# Patient Record
Sex: MALE | Race: WHITE
[De-identification: names, ages, dates, MRNs, and addresses within clinical notes are randomized per-mention and may not be internally consistent; named-entity substitution may affect disease eponyms.]

---

## 2017-04-17 ENCOUNTER — HOSPITAL ENCOUNTER (EMERGENCY)
Dept: HOSPITAL 62 - ER | Age: 32
LOS: 1 days | Discharge: HOME | End: 2017-04-18
Payer: OTHER GOVERNMENT

## 2017-04-17 DIAGNOSIS — L02.512: Primary | ICD-10-CM

## 2017-04-17 DIAGNOSIS — M79.645: ICD-10-CM

## 2017-04-17 PROCEDURE — 0H9GXZZ DRAINAGE OF LEFT HAND SKIN, EXTERNAL APPROACH: ICD-10-PCS | Performed by: EMERGENCY MEDICINE

## 2017-04-17 PROCEDURE — 99283 EMERGENCY DEPT VISIT LOW MDM: CPT

## 2017-04-18 VITALS — DIASTOLIC BLOOD PRESSURE: 74 MMHG | SYSTOLIC BLOOD PRESSURE: 136 MMHG

## 2017-04-18 NOTE — ER DOCUMENT REPORT
ED General





- General


Chief Complaint: Skin Problem


Stated Complaint: LEFT HAND POSSIBLE INFECTION


Notes: 


Patient is a 32-year-old male who presents with 2 days of progressively 

worsening swelling and pain to the dorsal aspect of his left index finger.  He 

describes this as a throbbing, constant, aching pain has been getting worse 

since onset.  Nothing improves or worsens his pain.  No history of similar 

symptoms in the past.  He denies any constitutional symptoms.  He denies any 

limited range with this digit.  No acute injury to this area.  He has not seen 

his primary care doctor regarding today's concerns.


TRAVEL OUTSIDE OF THE [place] IN LAST 30 DAYS: No





- Related Data


Allergies/Adverse Reactions: 


 





bupropion HCl [From Wellbutrin] Allergy (Verified 03/15/16 22:13)


 











Past Medical History





- General


Information source: Patient





- Social History


Smoking Status: Never Smoker


Frequency of alcohol use: None


Drug Abuse: None


Lives with: Spouse/Significant other


Family History: Reviewed & Not Pertinent


Patient has suicidal ideation: No


Patient has homicidal ideation: No


Renal/ Medical History: Denies: Hx Peritoneal Dialysis


Psychiatric Medical History: Reports: Hx Depression





- Immunizations


Immunizations up to date: Yes


Hx Diphtheria, Pertussis, Tetanus Vaccination: Yes





Physical Exam





- Vital signs


Vitals: 


 











Temp Pulse Resp BP Pulse Ox


 


 97.9 F   68   18   139/77 H  96 


 


 04/17/17 20:55  04/17/17 20:55  04/17/17 20:55  04/17/17 20:55  04/17/17 20:55











Interpretation: Normal


Notes: 


PHYSICAL EXAMINATION:





GENERAL: Well-appearing, well-nourished and in no acute distress.





HEAD: Atraumatic, normocephalic.





EYES:  sclera anicteric, conjunctiva are normal.





ENT: Moist mucous membranes.





NECK: Normal range of motion





LUNGS: Normal work of breathing





HEART: 2+ radial pulses bilaterally





EXTREMITIES: No pain along the flexor sheath of the left index finger.  Full 

flexion and extension of the DIP, PIP and DIP.  There is no circumferential 

edema.





NEUROLOGICAL: No focal neurological deficits. Moves all extremities 

spontaneously and on command.





PSYCH: Normal mood, normal affect.





SKIN: Warm, Dry, normal turgor, there is a small area of fluctuance at the base 

of the left index finger with minimal surrounding erythema.





Course





- Re-evaluation


Re-evalutation: 





04/18/17 00:53


Patient presents with a small abscess at the dorsal base of the left index 

finger with minimal surrounding cellulitis.  No evidence of flexor 

tenosynovitis.  Patient does not have pain over the flexor sheath.  His finger 

is not resting and flexion.  He has full flexion extension of the digit.  The 

abscess was incised and drained at the bedside without difficulty.  He will be 

started on Bactrim and Keflex as an outpatient.At this time will discharge with 

return precautions and follow-up recommendations.  Verbal discharge 

instructions given a the bedside and opportunity for questions given. 

Medication warnings reviewed. Patient is in agreement with this plan and has 

verbalized understanding of return precautions and the need for primary care 

follow-up in the next 24-72 hours.





- Vital Signs


Vital signs: 


 











Temp Pulse Resp BP Pulse Ox


 


 97.3 F   73   16   136/74 H  99 


 


 04/18/17 01:44  04/18/17 01:44  04/18/17 01:44  04/18/17 01:44  04/18/17 01:44














Procedures





- Incision and Drainage


  ** Left Finger


Type: Simple


Anesthetic type: 1% Lidocaine


mL's of anesthetic: 1


Blade size: 11


I&D procedure: Betadine prep applied


Incision Method: Incision made by scalpel


Amount/type of drainage: 1cc purulent drianage 





Discharge





- Discharge


Clinical Impression: 


 Abscess of left index finger





Condition: Good


Disposition: HOME, SELF-CARE


Additional Instructions: 


You were seen for an abscess that required drainage. Please clean this area 

with soap and water twice daily and apply a topical antibiotic. Dress the area 

after each cleaning.  Please return if you develop fever, vomiting, the pain at 

the site worsens, you notice spreading redness from the area, or you have any 

other symptoms that are concerning to you.


Prescriptions: 


Cephalexin Monohydrate [Keflex 500 mg Capsule] 500 mg PO QID #20 capsule


Sulfamethoxazole/Trimethoprim [Bactrim Ds Tablet] 2 tab PO BID 20 Days

## 2018-05-29 LAB
ANION GAP SERPL CALC-SCNC: 14 MMOL/L (ref 5–19)
APPEARANCE UR: CLEAR
APTT PPP: (no result) S
BILIRUB UR QL STRIP: NEGATIVE
BUN SERPL-MCNC: 10 MG/DL (ref 7–20)
CALCIUM: 9.3 MG/DL (ref 8.4–10.2)
CHLORIDE SERPL-SCNC: 104 MMOL/L (ref 98–107)
CO2 SERPL-SCNC: 28 MMOL/L (ref 22–30)
ERYTHROCYTE [DISTWIDTH] IN BLOOD BY AUTOMATED COUNT: 13.2 % (ref 11.5–14)
GLUCOSE SERPL-MCNC: 83 MG/DL (ref 75–110)
GLUCOSE UR STRIP-MCNC: NEGATIVE MG/DL
HCT VFR BLD CALC: 41.3 % (ref 37.9–51)
HGB BLD-MCNC: 14.3 G/DL (ref 13.5–17)
KETONES UR STRIP-MCNC: NEGATIVE MG/DL
MCH RBC QN AUTO: 29.9 PG (ref 27–33.4)
MCHC RBC AUTO-ENTMCNC: 34.7 G/DL (ref 32–36)
MCV RBC AUTO: 86 FL (ref 80–97)
NITRITE UR QL STRIP: NEGATIVE
PH UR STRIP: 8 [PH] (ref 5–9)
PLATELET # BLD: 186 10^3/UL (ref 150–450)
POTASSIUM SERPL-SCNC: 4.1 MMOL/L (ref 3.6–5)
PROT UR STRIP-MCNC: NEGATIVE MG/DL
RBC # BLD AUTO: 4.78 10^6/UL (ref 4.35–5.55)
SODIUM SERPL-SCNC: 145.9 MMOL/L (ref 137–145)
SP GR UR STRIP: 1
UROBILINOGEN UR-MCNC: NEGATIVE MG/DL (ref ?–2)
WBC # BLD AUTO: 6.4 10^3/UL (ref 4–10.5)

## 2018-06-05 ENCOUNTER — HOSPITAL ENCOUNTER (OUTPATIENT)
Dept: HOSPITAL 62 - OROUT | Age: 33
Discharge: HOME | End: 2018-06-05
Attending: ORTHOPAEDIC SURGERY
Payer: OTHER GOVERNMENT

## 2018-06-05 VITALS — DIASTOLIC BLOOD PRESSURE: 91 MMHG | SYSTOLIC BLOOD PRESSURE: 140 MMHG

## 2018-06-05 DIAGNOSIS — M65.832: Primary | ICD-10-CM

## 2018-06-05 DIAGNOSIS — Z79.1: ICD-10-CM

## 2018-06-05 DIAGNOSIS — Z88.8: ICD-10-CM

## 2018-06-05 DIAGNOSIS — M92.212: ICD-10-CM

## 2018-06-05 DIAGNOSIS — M25.532: ICD-10-CM

## 2018-06-05 PROCEDURE — 25825 ARTHRD WRIST WITH AUTOGRAFT: CPT

## 2018-06-05 PROCEDURE — 36415 COLL VENOUS BLD VENIPUNCTURE: CPT

## 2018-06-05 PROCEDURE — 85027 COMPLETE CBC AUTOMATED: CPT

## 2018-06-05 PROCEDURE — 80048 BASIC METABOLIC PNL TOTAL CA: CPT

## 2018-06-05 PROCEDURE — 64772 INCISION OF SPINAL NERVE: CPT

## 2018-06-05 PROCEDURE — 73100 X-RAY EXAM OF WRIST: CPT

## 2018-06-05 PROCEDURE — 01830 ANES ARTHR/NDSC WRST/HND NOS: CPT

## 2018-06-05 PROCEDURE — 81001 URINALYSIS AUTO W/SCOPE: CPT

## 2018-06-05 PROCEDURE — C1769 GUIDE WIRE: HCPCS

## 2018-06-05 NOTE — OPERATIVE REPORT
Operative Report


PREOPERATIVE DIAGNOSIS: Kienbock's disease left wrist


POSTOPERATIVE DIAGNOSIS: Same


OPERATION: 1.  Left Scaphocapitate Arthrodesis w/ Lunate Excision.  2. PIN 

Neurectomy


SURGEON: MIGUEL DESOUZA


ANESTHESIA: GA


COMPLICATIONS: 





None


ESTIMATED BLOOD LOSS: Minimal


PROCEDURE: 





Indication for above procedure:





33-year-old male who has a history of Kienbock's underwent previous radial 

shortening but failed to see improvement of his symptoms and continued to have 

discomfort we discussed treatment options including continued observation 

versus operative intervention after discussing alternatives to treatment 

including arthrodesis.  Joint decision was made to proceed with operative 

treatment.  Risks and benefits were explained patient verbalized understanding 

consented for the procedure.





Procedure In Detail:





Patient was seen and evaluated in the preoperative holding area.  The upper 

extremity was initialized and marked.  Patient received 2g of Ancef IV for 

bacterial prophylaxis.  Patient was taken back to the operative room where 

transferred to the operative table and placed under general anesthesia.  Once 

they were adequately anesthetized a nonsterile tourniquet was placed on the 

upper extremity.  A surgical team debriefing was performed ensuring all 

instrumentation was available, the surgical procedure was discussed with 

possible concerns reviewed.  The upper extremity was prepped with chlorhexidine 

and alcohol and draped in a sterile fashion.   A timeout was done identifying 

correct patient, procedure and extremity everyone in attendance agree with this 

and verbalized no concerns. The extremity was exsanguinated the tourniquet was 

inflated to 250 mmHg.





Longitudinal skin incision was made just ulnar to Sandy's tubercle.  Blunt 

dissection was performed branches of the superficial radial nerve were 

identified.  Small peripheral veins were coagulated with bipolar cautery.  EPL 

was identified and released distally.  I then elevated the second and fourth 

dorsal compartments exposing the DIC and DRC.  The posterior interosseous nerve 

was identified one centimeter resected.





A radial based ligament sparing capsulotomy was then made exposing the carpus.  

There was evidence of significant synovitis thus a synovectomy was performed.  

Inspection of the lunate fossa demonstrates no evidence of degenerative 

changes.  The radial-scaphoid articulation demonstrate no evidence of 

degenerative changes thus decision was made to proceed with scaphoid capitate 

arthrodesis.  The lunate was initially isolated and removed in its entirety 

along with any remaining small bone fragments.  There is significant avascular 

necrosis of the lunate which was not viable.  I then exposed the interval 

between the scaphoid and capitate.





With the use of rongeurs and a osteotome 80% of the articulation between the 

scaphoid and capitate was debrided down to good cancellus bone to allow for 

optimal arthrodesis union.  A 0.062 K wire was placed along the proximal 

scaphoid to reduce the radius scaphoid angle to 40.  A percutaneous K wire on 

oscillate was placed across the scaphoid capitate articulation maintaining 

reduction.  C-arm fluoroscopy was obtained to confirm adequate reduction.





With the measuring tool the BME staple measurement was determined.  0.045 K 

wires were placed into the capitate and scaphoid C-arm fluoroscopy was obtained 

confirming adequate placement of my staple.  A 15 x 12 mm staple was placed a 

small bone trough was placed between the 2 legs of the staple and adequate 

compression was established.  I then placed a second 15 x 12 mm staple just 

distal to the previous staple to provide further compression.  Once again K 

wires were placed confirming adequate location of the staple.





I then proceeded with harvesting autograft.  Sandy's tubercle was removed and 

a small cortical window was placed dorsally and cancellus bone was retrieved.  

The wound was copiously irrigated with normal saline.  The cancellus autograft 

was packed into any remaining space between the scaphoid and capitate until 

adequately filled.  Final C-arm fluoroscopy was obtained confirming adequate 

arthrodesis and alignment of the capitate to the scaphoid and the radial 

scaphoid angle.





The capsule was closed with interrupted 3-0 Ethibond suture.  A portion of the 

extensor retinaculum was placed volar to the EPL tendon at the area of the bone 

graft harvest site to avoid tendon irritation.  The tourniquet was then 

deflated and compression was held for 2 minutes.  Any peripheral bleeding was 

coagulated bipolar cautery into the wound was dry.  Subcutaneous tissues were 

closed with 3-0 Monocryl suture.  Skin was closed with running subcuticular 4-0 

Monocryl reinforced with Dermabond and Steri-Strips.  30 cc of 0.5% Marcaine 

with epinephrine was injected for postoperative pain control.  Patient was 

placed in a dorsal plaster splint in neutral position.





Sponge counts, instrument counts, needle counts counts were correct.  Patient 

was then awoken from anesthesia.  Transferred from the operating room table to 

the operating room stretcher.  There was no intraoperative complications 

patient tolerated procedure well stable to PACU.





Postoperative plan:





Patient will follow-up the office in 2 weeks at which point we will obtain 

radiographs and patient will be transitioned to a short arm cast until 

arthrodesis union.

## 2018-06-05 NOTE — RADIOLOGY REPORT (SQ)
EXAM DESCRIPTION:  NO CHG FLUORO; WRIST LEFT 2 VIEWS



COMPLETED DATE/TIME:  6/5/2018 11:35 am



REASON FOR STUDY:  LT WRIST SCAPHOCAPITATE ARTHRODESIS WITH LUNATE EXCISION M25.532  PAIN IN LEFT WRI
ST



COMPARISON:  None.



FLUOROSCOPY TIME:  1 minutes 2 seconds.

5 images saved to PACS.



TECHNIQUE:  Intra-operative images acquired during surgical procedure to evaluate progress.

NUMBER OF IMAGES: 5 images.



LIMITATIONS:  None.



FINDINGS:  Images of the wrist acquired during surgical procedure.



IMPRESSION:  IMAGE(S) OBTAINED DURING PROCEDURE.



COMMENT:  Quality :  Final reports for procedures using fluoroscopy that document radiation exp
osure indices, or exposure time and number of fluorographic images (if radiation exposure indices are
 not available)

Please consult full operative report of the attending physician for description of the procedure.



TECHNICAL DOCUMENTATION:  JOB ID:  [id number]

 2011 [employer]- All Rights Reserved



Reading location - IP/workstation name: [email or web address]

## 2018-06-05 NOTE — DISCHARGE SUMMARY
Discharge Summary (SDC)





- Discharge


Final Diagnosis: 





Kienbock's Disease Left Wrist


Date of Surgery: 06/05/18


Discharge Date: 06/05/18


Condition: Good


Treatment or Instructions: 











Schedule Follow Up w/ Dr. Flex Johnson @ [facility] for Surgery to be seen 

in 10-14 days or as scheduled


[place]: [phone] 


[place]: [phone]


[place]: [phone] 





Ice and elevate





Keep splint clean/dry/intact.





If your fingers become numb please unwrap the Ace wrap but leave the splint in 

place, if the sensation does not return within 30 minutes please return to the 

emergency department.





May begin finger range of motion attempting to make full fist.





Please use ibuprofen (Motrin or Advil) 600-800 mg every 8 hours as needed for 

pain or fever DO NOT TAKE w/ TORADOL may use once TORADOL complete.  You may 

also use acetaminophen (Tylenol) 1000 mg every 4-6 hours as needed for pain or 

fever.  Please be aware that many medications contain acetaminophen, do not 

exceed a total of 1000 mg of acetaminophen every 6 hours.  





If ibuprofen and acetaminophen are not sufficient for your pain you may take 

the Percocet/Norco.  Please be aware that the Percocet/Norco does contain 

Tylenol.





Stool softener of choice when on pain medication.


Prescriptions: 


Ketorolac Tromethamine [Toradol 10 mg Tablet] 10 mg PO Q8HP PRN #10 tablet


 PRN Reason: 


Oxycodone HCl/Acetaminophen [Percocet 7.5-325 mg Tablet] 1 - 2 tab PO ASDIR PRN 

#35 tab


 PRN Reason:

## 2018-06-05 NOTE — RADIOLOGY REPORT (SQ)
EXAM DESCRIPTION:  NO CHG FLUORO; WRIST LEFT 2 VIEWS



COMPLETED DATE/TIME:  6/5/2018 11:35 am



REASON FOR STUDY:  LT WRIST SCAPHOCAPITATE ARTHRODESIS WITH LUNATE EXCISION M25.532  PAIN IN LEFT WRI
ST



COMPARISON:  None.



FLUOROSCOPY TIME:  1 minutes 2 seconds.

5 images saved to PACS.



TECHNIQUE:  Intra-operative images acquired during surgical procedure to evaluate progress.

NUMBER OF IMAGES: 5 images.



LIMITATIONS:  None.



FINDINGS:  Images of the wrist acquired during surgical procedure.



IMPRESSION:  IMAGE(S) OBTAINED DURING PROCEDURE.



COMMENT:  Quality :  Final reports for procedures using fluoroscopy that document radiation exp
osure indices, or exposure time and number of fluorographic images (if radiation exposure indices are
 not available)

Please consult full operative report of the attending physician for description of the procedure.



TECHNICAL DOCUMENTATION:  JOB ID:  [id number]

 2011 [employer]- All Rights Reserved



Reading location - IP/workstation name: [email or web address]

## 2019-11-14 ENCOUNTER — HOSPITAL ENCOUNTER (EMERGENCY)
Dept: HOSPITAL 62 - ER | Age: 34
Discharge: HOME | End: 2019-11-14
Payer: OTHER GOVERNMENT

## 2019-11-14 VITALS — SYSTOLIC BLOOD PRESSURE: 134 MMHG | DIASTOLIC BLOOD PRESSURE: 77 MMHG
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
## 2019-11-14 DIAGNOSIS — Z23: ICD-10-CM

## 2019-11-14 DIAGNOSIS — S61.451A: Primary | ICD-10-CM
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
## 2019-11-14 PROCEDURE — 90471 IMMUNIZATION ADMIN: CPT

## 2019-11-14 PROCEDURE — 99283 EMERGENCY DEPT VISIT LOW MDM: CPT

## 2019-11-14 PROCEDURE — 90715 TDAP VACCINE 7 YRS/> IM: CPT

## 2019-11-14 PROCEDURE — 73130 X-RAY EXAM OF HAND: CPT

## 2019-11-14 NOTE — ER DOCUMENT REPORT
HPI





- HPI


Time Seen by Provider: 11/14/19 19:01


Pain Level: 3


Context: 





Patient is a 34-year-old male who presents to the emergency department with a 

chief complaint of dog bite.  Patient reports around 1600 this afternoon he was 

at a friend's house who was dog sitting when he was bit by the Rottweiler mix.  

Patient reports this was unprovoked.  The owner reports the shots are up-to-date

including the rabies vaccine.  Patient reports his tetanus shot is not up-to-da

te.  Patient reports 2 puncture wounds to the top of his right hand and 1 to the

inside palm.  Patient reports significant swelling and pain that is worse with 

movement.  Patient denies numbness or tingling to his fingers.  Patient reports 

he feels like his hand is broken.  Patient states he is not taking any pain 

medication prior to arrival.





- REPRODUCTIVE


Reproductive: DENIES: Pregnant:





Past Medical History





- General


Information source: Patient





- Social History


Smoking Status: Never Smoker


Chew tobacco use (# tins/day): No


Frequency of alcohol use: None


Lives with: Alone


Family History: Reviewed & Not Pertinent


Patient has suicidal ideation: No


Patient has homicidal ideation: No





- Past Medical History


Cardiac Medical History: Reports: None


   Denies: Hx Coronary Artery Disease, Hx Heart Attack, Hx Hypertension


Pulmonary Medical History: Reports: None


   Denies: Hx Asthma, Hx Bronchitis, Hx COPD, Hx Pneumonia


EENT Medical History: Reports: None


Neurological Medical History: Reports: None.  Denies: Hx Cerebrovascular 

Accident, Hx Seizures


Endocrine Medical History: Reports: None


Renal/ Medical History: Reports: None.  Denies: Hx Peritoneal Dialysis


Malignancy Medical History: Reports None


GI Medical History: Reports: None


Musculoskeletal Medical History: Reports None, Denies Hx Arthritis


Skin Medical History: Reports None


Psychiatric Medical History: Reports: Hx Depression





- Immunizations


Immunizations up to date: Yes


Hx Diphtheria, Pertussis, Tetanus Vaccination: Yes





Vertical Provider Document





- CONSTITUTIONAL


Agree With Documented VS: Yes


Exam Limitations: No Limitations


General Appearance: No Apparent Distress





- INFECTION CONTROL


TRAVEL OUTSIDE OF THE [place] IN LAST 30 DAYS: No





- HEENT


HEENT: Atraumatic, Normal ENT Exam, Normocephalic, PERRLA





- NECK


Neck: Normal Inspection





- RESPIRATORY


Respiratory: Breath Sounds Normal, No Respiratory Distress





- CARDIOVASCULAR


Cardiovascular: Regular Rate, Regular Rhythm





- GI/ABDOMEN


Gastrointestinal: Abdomen Soft, Abdomen Non-Tender, Normal Bowel Sounds





- MUSCULOSKELETAL/EXTREMETIES


Musculoskeletal/Extremeties: FROM


Notes: 





Patient has 2 puncture wounds noted to the dorsal aspect of the right hand and 

one puncture wound noted to the palmar aspect of the right hand.  Patient does 

have edema noted around the site.  No active bleeding.  Patient has less than 

2-second cap refill in all digits of the right hand.  Patient unable to make a 

fist and squeeze my fingers as he does have significant swelling.





- NEURO


Level of Consciousness: Awake, Alert, Appropriate





- DERM


Integumentary: Warm, Dry, No Rash





Course





- Re-evaluation


Re-evalutation: 





11/14/19 19:12


We will obtain an x-ray of the right hand to rule out foreign body and fracture.

 I did offer the patient pain medication to include ibuprofen but the patient 

reports he is okay at this time.  Will provide wound care, update tetanus.


11/14/19 19:57


Did discuss results of the x-ray with the patient.  Patient instructed to keep 

the wound clean and dry.  Antibiotic prophylaxis given today as well as 

tightness.  Patient instructed to keep the extremity elevated.





- Vital Signs


Vital signs: 


                                        











Temp Pulse Resp BP Pulse Ox


 


 97.9 F   81   16   152/87 H  99 


 


 11/14/19 18:01  11/14/19 18:01  11/14/19 18:01  11/14/19 18:01  11/14/19 18:01














- Diagnostic Test


Radiology reviewed: Reports reviewed


Radiology results interpreted by me: 





11/14/19 19:57


                                        





Hand X-Ray  11/14/19 19:09


IMPRESSION:  NEGATIVE STUDY OF THE RIGHT HAND. NO RADIOGRAPHIC EVIDENCE OF ACUTE

INJURY.


 














Discharge





- Discharge


Clinical Impression: 


Dog bite


Qualifiers:


 Encounter type: initial encounter Qualified Code(s): W54.0XXA - Bitten by dog, 

initial encounter





Dog bite of right hand


Qualifiers:


 Encounter type: initial encounter Qualified Code(s): S61.451A - Open bite of 

right hand, initial encounter





Condition: Stable


Disposition: HOME, SELF-CARE


Additional Instructions: 


*Today you are seen in the emergency department for a dog bite to the right 

hand.  We did obtain an x-ray which was negative for an acute fracture.  He will

continue to have swelling as you did have trauma to this hand.  Please ice, 

elevate the right hand to aid with swelling and discomfort.  Please continue to 

move your hands frequently and attempt to make a fist.  Use Tylenol and 

ibuprofen as needed for pain.  We have given your first dose of oral antibiotics

and you will be placed on antibiotics for the next 10 days.  This is to prevent 

infection as an animal bite are extremely dirty.  Please keep the wounds clean 

dry.  Puncture wounds from a dog bite are not closed due to the risk for 

infection.














Animal Bites





     Animal bites are often heavily contaminated with bacteria.  In spite of 

thorough cleansing and proper treatment, these wounds frequently become 

infected.  Bite wounds of the hands are especially prone to complications.


     Bites are dressed, if possible.  Large wounds may require suturing after 

internal cleansing.  Because of infection risk, some large wounds must remain 

unstitched.  Your doctor is trained to advise you on the best treatment for your

bite.


     Call the doctor at once if the wound becomes red, swollen, warm, 

increasingly painful, or if it begins to drain.  Danger signs also include red 

streaks up the involved extremity, swollen glands in the groin or under the arm,

or fever and chills.


     The risk of rabies from domestic animals is very low.  Bats, sick animals, 

and wild animals may expose you to rabies.  The physician, or the health 

department, will inform you if you will need to receive the rabies vaccine.





Prescriptions: 


Amox Tr/Potassium Clavulanate [Augmentin 875-125 Tablet] 1 tab PO BID 10 Days  

tablet


Forms:  Return to Work


Referrals: 


KINJAL SIBLEY MD [Primary Care Provider] - Follow up as needed

## 2019-11-14 NOTE — RADIOLOGY REPORT (SQ)
EXAM DESCRIPTION:  HAND RIGHT 3 VIEWS



COMPLETED DATE/TIME:  11/14/2019 7:20 pm



REASON FOR STUDY:  dog bite right hand



COMPARISON:  None.



EXAM PARAMETERS:  NUMBER OF VIEWS: Three views.

TECHNIQUE: AP, lateral and oblique  radiographic images acquired of the right hand.

LIMITATIONS: None.



FINDINGS:  MINERALIZATION: Normal.

BONES: No acute fracture or dislocation.  No worrisome bone lesions.

JOINTS: No effusions.

SOFT TISSUES: Soft tissue swelling.  No foreign body.

OTHER: No other significant finding.



IMPRESSION:  NEGATIVE STUDY OF THE RIGHT HAND. NO RADIOGRAPHIC EVIDENCE OF ACUTE INJURY.



TECHNICAL DOCUMENTATION:  JOB ID:  [id number]

 2011 [employer]- All Rights Reserved



Reading location - IP/workstation name: [facility]